# Patient Record
(demographics unavailable — no encounter records)

---

## 2024-10-09 NOTE — CONSULT LETTER
[Dear  ___] : Dear  [unfilled], [Consult Letter:] : I had the pleasure of evaluating your patient, [unfilled]. [Please see my note below.] : Please see my note below. [Consult Closing:] : Thank you very much for allowing me to participate in the care of this patient.  If you have any questions, please do not hesitate to contact me. [Sincerely,] : Sincerely, [FreeTextEntry3] : Quynh Silva MD, FSVS, FACS Associate Chief, Vascular & Endovascular Surgery , Vascular Surgery Fellowship & Residency  Associate Professor of Surgery, [employer] at [facility]  Division of Vascular & Endovascular Surgery [facility] [address], [address] [place] [id number] Tel: [phone]

## 2024-10-09 NOTE — ASSESSMENT
[FreeTextEntry1] : 73 y/o F referred for carotid disease. Prior MRI obtained in July reported "72% stenosis" according to patient. She then obtained a carotid duplex with PSV  WNL   h/p MCA stroke r/o carotid stenosis  Plan:  Due to the discrepancy of the reported studies we will obtain a carotid duplex within our vascular lab.  She would like to come back as a study only patient  Will continue ASA and statin

## 2024-10-09 NOTE — HISTORY OF PRESENT ILLNESS
[FreeTextEntry1] : 73 y/o F presenting from neurologist for concern of carotid stenosis. She has a hx of MCA stroke in 2023. She has residual vision loss. No motor dysfunction.

## 2024-10-09 NOTE — PHYSICAL EXAM
[Alert] : alert [Oriented to Person] : oriented to person [Oriented to Place] : oriented to place [Oriented to Time] : oriented to time [Ankle Swelling (On Exam)] : not present [Varicose Veins Of Lower Extremities] : not present [] : not present [de-identified] : NAD

## 2025-01-17 NOTE — PHYSICAL EXAM
[Normal Sclera/Conjunctiva] : normal sclera/conjunctiva [Normal Outer Ear/Nose] : the outer ears and nose were normal in appearance [No Edema] : there was no peripheral edema [Normal] : soft, non-tender, non-distended, no masses palpated, no HSM and normal bowel sounds [Coordination Grossly Intact] : coordination grossly intact [No Focal Deficits] : no focal deficits [Speech Grossly Normal] : speech grossly normal [Memory Grossly Normal] : memory grossly normal [Normal Affect] : the affect was normal [Normal Insight/Judgement] : insight and judgment were intact [Normal Posterior Cervical Nodes] : no posterior cervical lymphadenopathy [Normal Anterior Cervical Nodes] : no anterior cervical lymphadenopathy [No Rash] : no rash [de-identified] : ambulates with cane?

## 2025-01-17 NOTE — PHYSICAL EXAM
[Normal Sclera/Conjunctiva] : normal sclera/conjunctiva [Normal Outer Ear/Nose] : the outer ears and nose were normal in appearance [No Edema] : there was no peripheral edema [Normal] : soft, non-tender, non-distended, no masses palpated, no HSM and normal bowel sounds [Coordination Grossly Intact] : coordination grossly intact [No Focal Deficits] : no focal deficits [Speech Grossly Normal] : speech grossly normal [Memory Grossly Normal] : memory grossly normal [Normal Affect] : the affect was normal [Normal Insight/Judgement] : insight and judgment were intact [Normal Posterior Cervical Nodes] : no posterior cervical lymphadenopathy [Normal Anterior Cervical Nodes] : no anterior cervical lymphadenopathy [No Rash] : no rash [de-identified] : ambulates with cane?

## 2025-01-17 NOTE — HISTORY OF PRESENT ILLNESS
[de-identified] : 74F, here for follow up.  pt saw vascular surgeon for carotid stenosis. pending repeat carotid. She had 2 discordant carotid sonograms (one was 40% stenosis and one  was 70%).  h/o stroke with residual visual deficits: on aspirin-Dipyridamole 25-200mg Q12H DM not on medication but on LSM  HTN: Amlodipine 2.5mg, metoprolol 25mg, BP above goal today.  HLD: Atorvastatin 40mg, Zetia 10mg  GERD: omeprazole 40mg,  hypothyroidism: on Synthroid 88mcg, f/b endocrine Pt is f/b [facility] for lung nodule/COPD  referral for [facility]

## 2025-01-17 NOTE — PLAN
[FreeTextEntry1] : 74F, here for follow up  HTN: BP above goal, pt advised to consider increase medication if her BP does not improve with LSM (has been eating whatever she wants while visiting her daugther)  HLD on atorvastatin 40mg and zetia, recheck lab.  CVA: renew aspirin-Dipyridamole,  referral for Cologuard  hypothyroidism: on LT4 88mcg, appears euthyroid, check thyroid function

## 2025-01-17 NOTE — HISTORY OF PRESENT ILLNESS
[de-identified] : 74F, here for follow up.  pt saw vascular surgeon for carotid stenosis. pending repeat carotid. She had 2 discordant carotid sonograms (one was 40% stenosis and one  was 70%).  h/o stroke with residual visual deficits: on aspirin-Dipyridamole 25-200mg Q12H DM not on medication but on LSM  HTN: Amlodipine 2.5mg, metoprolol 25mg, BP above goal today.  HLD: Atorvastatin 40mg, Zetia 10mg  GERD: omeprazole 40mg,  hypothyroidism: on Synthroid 88mcg, f/b endocrine Pt is f/b [facility] for lung nodule/COPD  referral for [facility]

## 2025-03-09 NOTE — HISTORY OF PRESENT ILLNESS
[FreeTextEntry8] : 74 yrs old female with history of DMII, GERD, HLD, HTN, hypothyroidism, kidney cysts, SCC, sciatica, CVA, lung nodule here for acute visit. Main complaint is vertigo. This is the first episode of vertigo as per patient.  In the past 1-2 weeks patient has had sinus symptoms- sinus headache-pressure, sinus congestion- green mucous.  Patient took Meclizine on her own, with only partial improvement- still has vertigo with standing and movement. Due to history of CVA- patient already made appt to see her neurologist to make sure not neurological in origin. Patient clarified that bilateral carotid dopplers were under 50% stenosis. Patient also set up appt for ENT 3/11/25.   Allergic to Doxycycline-nausea Iodine based dyes Morphine Medication: New: Meclizine 25 mg BID PRN No changes in medication  Former smoker, no ETOH or drugs

## 2025-03-09 NOTE — PHYSICAL EXAM
[Normal] : normal rate, regular rhythm, normal S1 and S2 and no murmur heard [No Edema] : there was no peripheral edema [de-identified] : Nomotensive  BMI: 22.67 [de-identified] : +Nystagmus

## 2025-03-09 NOTE — PHYSICAL EXAM
[Normal] : normal rate, regular rhythm, normal S1 and S2 and no murmur heard [No Edema] : there was no peripheral edema [de-identified] : Nomotensive  BMI: 22.67 [de-identified] : +Nystagmus

## 2025-03-09 NOTE — PLAN
[FreeTextEntry1] : 74 yrs old female here for acute visit. Patient has had sinus pain/congestion for 1-2 weeks associated with green mucous. Patient also developed vertigo and nystagmus.   Patient states that vertigo is less intense and frequent with Meclizine 25 mg BID PRN, but not gone. IMPRESSION:  Acute sinusitis, vertigo with nystagmus Start Augmentin 875 mg Q12 hours X 10 days Also on patient's request- add Fluconazole for likely yeast infection from antibiotics. ENT appt to confirm positional vertigo diagnosis. Will treat sinusitis- patient may also benefit from vestibular therapy- if not resolved with antibiotics Continue Meclizine PRN use Take Flonase- has at home 2 puffs QHS bilateral nostrils Pt also to see neurology later today to rule out CNS source of vertigo RV 1 week or sooner if worsens, or other neurological symptoms occur Patient specific education provided Patient understands instructions and agrees with plan

## 2025-05-13 NOTE — PHYSICAL EXAM
[No Edema] : there was no peripheral edema [Normal] : soft, non-tender, non-distended, no masses palpated, no HSM and normal bowel sounds [Coordination Grossly Intact] : coordination grossly intact [No Focal Deficits] : no focal deficits [Normal Affect] : the affect was normal [Normal Insight/Judgement] : insight and judgment were intact

## 2025-05-15 NOTE — HISTORY OF PRESENT ILLNESS
[FreeTextEntry8] : 74F here for acute episode for dizziness.  visual changes.  lightheaded  Pt also here for follow up.  DM on LSM HTN on amlodipine 2.5mg qd, metoprolol ER 25mg qd  HLD atorvastatin 40mg and Zetia  CVA on aspirin-dipyridamole ER 25-200mg  Hypothyroidism on Synthroid 88mcg

## 2025-05-15 NOTE — PLAN
[FreeTextEntry1] : 74F here for acute episode for dizziness.  Pt also here for follow up.  DM on LSM HTN on amlodipine 2.5mg qd, metoprolol ER 25mg qd  HLD atorvastatin 40mg and Zetia  CVA on aspirin-dipyridamole ER 25-200mg  Hypothyroidism on Synthroid 88mcg

## 2025-05-27 NOTE — PHYSICAL EXAM
[Normal] : no carotid or abdominal bruits heard, no varicosities, pedal pulses are present, no peripheral edema, no extremity clubbing or cyanosis and no palpable aorta [Coordination Grossly Intact] : coordination grossly intact [No Focal Deficits] : no focal deficits [Normal Gait] : normal gait [Normal Affect] : the affect was normal [Normal Insight/Judgement] : insight and judgment were intact

## 2025-05-28 NOTE — HISTORY OF PRESENT ILLNESS
[de-identified] : 74F here for follow up and review recent b/w results.  DM now at 6.8 worsening compared to 6.4%, now diabetic range  GERD on Omeprazole 40mg qd, doing well.  HLD on atorvastatin 40mg and Zetia 10mg, LDL at 33 at goal  HTN on amlodipine 2.5mg qd, metoprolol ER 25mg qd, BP today 134/79,  hypothyroidism on Synthroid 88mcg, TSH 0.24 but normal FT4, denies symptoms of  hypo/hyperthyroidism.  CVA f/b neurology, on aspirin-Dipyridamole 25-100mg Q12hr.  recent b/w also showed low bicarb, diarrhea or dehydration? Pt had diarrhea prior to b/w.  right knee pain xmonths, request referral to ortho

## 2025-05-28 NOTE — PLAN
[FreeTextEntry1] : 74F here for follow up and review recent b/w results.  DM new diabetes diagnosis, goal A1C is <7.8 to 8, pt counseled on the need to make dietary changes (reducing carbs like rice/pasta/bread/potato/corn/juice/sweets) and increase exercise/plant-based protein like quinoa and beans. Pt advised to also moderate fruit intake. f/u in 3m GERD on Omeprazole 40mg qd  HLD on atorvastatin 40mg and Zetia 10mg, LDL at 33, at goal, continue current regimen.  HTN on amlodipine 2.5mg qd, metoprolol ER 25mg qd, BP today 134/79, at goal, will continue current regimen.  hypothyroidism on Synthroid 88mcg, TSH 0.24 but normal FT4, recheck TFT with next round of b/w.  CVA f/b neurology, on aspirin-Dipyridamole 25-100mg Q12hr.  low bicarb/metabolic acidosis, diarrhea resolved, pt to recheck b/w right knee pain x months, request referral to ortho  f/u  for CPE.